# Patient Record
(demographics unavailable — no encounter records)

---

## 2024-12-10 NOTE — HISTORY OF PRESENT ILLNESS
[FreeTextEntry1] : patient came for follow up, taking medication for anxiety medication. Patient decrease appetite and not taking her metfromin that often. Review labs and noted hgba1c now 5.7, testosterone 42.

## 2024-12-10 NOTE — REVIEW OF SYSTEMS
[Recent Weight Loss (___ Lbs)] : recent weight loss: [unfilled] lbs [Depression] : depression [Anxiety] : anxiety [Stress] : stress [Negative] : Heme/Lymph [Suicidal Ideation] : no suicidal ideation [Insomnia] : no insomnia [Homicidal Ideation] : no homicidal ideation

## 2024-12-10 NOTE — PHYSICAL EXAM
[Alert] : alert [Well Nourished] : well nourished [Obese] : obese [No Acute Distress] : no acute distress [Well Developed] : well developed [Normal Sclera/Conjunctiva] : normal sclera/conjunctiva [EOMI] : extra ocular movement intact [PERRL] : pupils equal, round and reactive to light [No Proptosis] : no proptosis [Normal Outer Ear/Nose] : the ears and nose were normal in appearance [Normal Hearing] : hearing was normal [Supple] : the neck was supple [Thyroid Not Enlarged] : the thyroid was not enlarged [No Respiratory Distress] : no respiratory distress [No Accessory Muscle Use] : no accessory muscle use [Normal Rate and Effort] : normal respiratory rate and effort [Clear to Auscultation] : lungs were clear to auscultation bilaterally [Normal S1, S2] : normal S1 and S2 [Normal Rate] : heart rate was normal [Regular Rhythm] : with a regular rhythm [Carotids Normal] : carotid pulses were normal with no bruits [No Edema] : no peripheral edema [Pedal Pulses Normal] : the pedal pulses are present [Normal Bowel Sounds] : normal bowel sounds [Not Tender] : non-tender [Not Distended] : not distended [Soft] : abdomen soft [Normal Anterior Cervical Nodes] : no anterior cervical lymphadenopathy [No CVA Tenderness] : no ~M costovertebral angle tenderness [No Spinal Tenderness] : no spinal tenderness [Spine Straight] : spine straight [No Stigmata of Cushings Syndrome] : no stigmata of Cushings Syndrome [Normal Gait] : normal gait [Normal Strength/Tone] : muscle strength and tone were normal [No Rash] : no rash [No Motor Deficits] : the motor exam was normal [Normal Reflexes] : deep tendon reflexes were 2+ and symmetric [No Tremors] : no tremors [Oriented x3] : oriented to person, place, and time [Normal Affect] : the affect was normal [Normal Mood] : the mood was normal [Kyphosis] : no kyphosis present [Scoliosis] : no scoliosis [Acanthosis Nigricans] : no acanthosis nigricans [de-identified] : PCOS, metabolic syndrome, Obesity

## 2024-12-10 NOTE — PHYSICAL EXAM
[Alert] : alert [Well Nourished] : well nourished [Obese] : obese [No Acute Distress] : no acute distress [Well Developed] : well developed [Normal Sclera/Conjunctiva] : normal sclera/conjunctiva [EOMI] : extra ocular movement intact [PERRL] : pupils equal, round and reactive to light [No Proptosis] : no proptosis [Normal Outer Ear/Nose] : the ears and nose were normal in appearance [Normal Hearing] : hearing was normal [Supple] : the neck was supple [Thyroid Not Enlarged] : the thyroid was not enlarged [No Respiratory Distress] : no respiratory distress [No Accessory Muscle Use] : no accessory muscle use [Normal Rate and Effort] : normal respiratory rate and effort [Clear to Auscultation] : lungs were clear to auscultation bilaterally [Normal S1, S2] : normal S1 and S2 [Normal Rate] : heart rate was normal [Regular Rhythm] : with a regular rhythm [Carotids Normal] : carotid pulses were normal with no bruits [No Edema] : no peripheral edema [Pedal Pulses Normal] : the pedal pulses are present [Normal Bowel Sounds] : normal bowel sounds [Not Tender] : non-tender [Not Distended] : not distended [Soft] : abdomen soft [Normal Anterior Cervical Nodes] : no anterior cervical lymphadenopathy [No CVA Tenderness] : no ~M costovertebral angle tenderness [No Spinal Tenderness] : no spinal tenderness [Spine Straight] : spine straight [No Stigmata of Cushings Syndrome] : no stigmata of Cushings Syndrome [Normal Gait] : normal gait [Normal Strength/Tone] : muscle strength and tone were normal [No Rash] : no rash [No Motor Deficits] : the motor exam was normal [Normal Reflexes] : deep tendon reflexes were 2+ and symmetric [No Tremors] : no tremors [Oriented x3] : oriented to person, place, and time [Normal Affect] : the affect was normal [Normal Mood] : the mood was normal [Kyphosis] : no kyphosis present [Scoliosis] : no scoliosis [Acanthosis Nigricans] : no acanthosis nigricans [de-identified] : PCOS, metabolic syndrome, Obesity

## 2024-12-10 NOTE — ASSESSMENT
[Weight Loss] : weight loss [FreeTextEntry4] : 1600 vera ada diet [FreeTextEntry1] : 1. PCOS/ Obesity 1600 vera diet, exercise metformin 500 mg po -abdominal discomfort, will change to spironolactone 50 mg daily due elevated bp too. Discussed not to get pregnant. She said she is not planning.

## 2024-12-10 NOTE — PHYSICAL EXAM
[Alert] : alert [Well Nourished] : well nourished [Obese] : obese [No Acute Distress] : no acute distress [Well Developed] : well developed [Normal Sclera/Conjunctiva] : normal sclera/conjunctiva [EOMI] : extra ocular movement intact [PERRL] : pupils equal, round and reactive to light [No Proptosis] : no proptosis [Normal Outer Ear/Nose] : the ears and nose were normal in appearance [Normal Hearing] : hearing was normal [Supple] : the neck was supple [Thyroid Not Enlarged] : the thyroid was not enlarged [No Respiratory Distress] : no respiratory distress [No Accessory Muscle Use] : no accessory muscle use [Normal Rate and Effort] : normal respiratory rate and effort [Clear to Auscultation] : lungs were clear to auscultation bilaterally [Normal S1, S2] : normal S1 and S2 [Normal Rate] : heart rate was normal [Regular Rhythm] : with a regular rhythm [Carotids Normal] : carotid pulses were normal with no bruits [No Edema] : no peripheral edema [Pedal Pulses Normal] : the pedal pulses are present [Normal Bowel Sounds] : normal bowel sounds [Not Tender] : non-tender [Not Distended] : not distended [Soft] : abdomen soft [Normal Anterior Cervical Nodes] : no anterior cervical lymphadenopathy [No CVA Tenderness] : no ~M costovertebral angle tenderness [No Spinal Tenderness] : no spinal tenderness [Spine Straight] : spine straight [No Stigmata of Cushings Syndrome] : no stigmata of Cushings Syndrome [Normal Gait] : normal gait [Normal Strength/Tone] : muscle strength and tone were normal [No Rash] : no rash [No Motor Deficits] : the motor exam was normal [Normal Reflexes] : deep tendon reflexes were 2+ and symmetric [No Tremors] : no tremors [Oriented x3] : oriented to person, place, and time [Normal Affect] : the affect was normal [Normal Mood] : the mood was normal [Kyphosis] : no kyphosis present [Scoliosis] : no scoliosis [Acanthosis Nigricans] : no acanthosis nigricans [de-identified] : PCOS, metabolic syndrome, Obesity

## 2025-03-11 NOTE — ASSESSMENT
[Weight Loss] : weight loss [FreeTextEntry4] : 1600 vera diet, exericise [FreeTextEntry1] : 1. PCOS/ Obesity 1600 vera diet, exercise metformin 500 mg po -abdominal discomfort occasionally will decrease to once a day  will change to spironolactone 50 mg daily due elevated bp too- discussed about pregnancy adverse effect Discussed not to get pregnant. She said she is not planning.

## 2025-03-11 NOTE — PHYSICAL EXAM
[Alert] : alert [Well Nourished] : well nourished [Obese] : obese [No Acute Distress] : no acute distress [Well Developed] : well developed [Normal Sclera/Conjunctiva] : normal sclera/conjunctiva [EOMI] : extra ocular movement intact [PERRL] : pupils equal, round and reactive to light [No Proptosis] : no proptosis [Normal Outer Ear/Nose] : the ears and nose were normal in appearance [Normal Hearing] : hearing was normal [Supple] : the neck was supple [Thyroid Not Enlarged] : the thyroid was not enlarged [No Respiratory Distress] : no respiratory distress [No Accessory Muscle Use] : no accessory muscle use [Normal Rate and Effort] : normal respiratory rate and effort [Clear to Auscultation] : lungs were clear to auscultation bilaterally [Normal S1, S2] : normal S1 and S2 [Normal Rate] : heart rate was normal [Regular Rhythm] : with a regular rhythm [Carotids Normal] : carotid pulses were normal with no bruits [No Edema] : no peripheral edema [Pedal Pulses Normal] : the pedal pulses are present [Normal Bowel Sounds] : normal bowel sounds [Not Tender] : non-tender [Not Distended] : not distended [Soft] : abdomen soft [Normal Anterior Cervical Nodes] : no anterior cervical lymphadenopathy [No CVA Tenderness] : no ~M costovertebral angle tenderness [No Spinal Tenderness] : no spinal tenderness [Spine Straight] : spine straight [Kyphosis] : no kyphosis present [Scoliosis] : no scoliosis [No Stigmata of Cushings Syndrome] : no stigmata of Cushings Syndrome [Normal Gait] : normal gait [Normal Strength/Tone] : muscle strength and tone were normal [No Rash] : no rash [Acanthosis Nigricans] : no acanthosis nigricans [No Motor Deficits] : the motor exam was normal [Normal Reflexes] : deep tendon reflexes were 2+ and symmetric [No Tremors] : no tremors [Oriented x3] : oriented to person, place, and time [Normal Affect] : the affect was normal [Normal Mood] : the mood was normal [de-identified] : PCOS, metabolic syndrome, Obesity

## 2025-03-11 NOTE — HISTORY OF PRESENT ILLNESS
[FreeTextEntry1] : patient came for follow up weight management, taking medication for anxiety medication.  Patient decrease appetite and not taking her metformin that often.  Review labs and noted hgba1c now 5.3, was 5.7, testosterone 30,was 42. Current bmi is 34, was 36 she wants to lose weight.  Would like to get injections Wegovy, will try to get approval.

## 2025-04-14 NOTE — ASSESSMENT
[Weight Loss] : weight loss [FreeTextEntry4] : 1600 vera ada diet, exercise [FreeTextEntry1] : 1. PCOS/ Obesity 1600 vera diet, exercise metformin 500 mg po -abdominal discomfort occasionally will decrease to once a day  will change to spironolactone 50 mg daily due elevated bp too- discussed about pregnancy adverse effect Discussed not to get pregnant. She said she is not planning. will start Wegovy compounding 0.25 mg with vitamin b 12 sc weekly.

## 2025-04-14 NOTE — PHYSICAL EXAM
[Alert] : alert [Well Nourished] : well nourished [Obese] : obese [No Acute Distress] : no acute distress [Well Developed] : well developed [Normal Sclera/Conjunctiva] : normal sclera/conjunctiva [EOMI] : extra ocular movement intact [PERRL] : pupils equal, round and reactive to light [No Proptosis] : no proptosis [Normal Outer Ear/Nose] : the ears and nose were normal in appearance [Normal Hearing] : hearing was normal [Supple] : the neck was supple [Thyroid Not Enlarged] : the thyroid was not enlarged [No Respiratory Distress] : no respiratory distress [No Accessory Muscle Use] : no accessory muscle use [Normal Rate and Effort] : normal respiratory rate and effort [Clear to Auscultation] : lungs were clear to auscultation bilaterally [Normal S1, S2] : normal S1 and S2 [Normal Rate] : heart rate was normal [Regular Rhythm] : with a regular rhythm [Carotids Normal] : carotid pulses were normal with no bruits [No Edema] : no peripheral edema [Pedal Pulses Normal] : the pedal pulses are present [Normal Bowel Sounds] : normal bowel sounds [Not Tender] : non-tender [Not Distended] : not distended [Soft] : abdomen soft [Normal Anterior Cervical Nodes] : no anterior cervical lymphadenopathy [No CVA Tenderness] : no ~M costovertebral angle tenderness [No Spinal Tenderness] : no spinal tenderness [Spine Straight] : spine straight [Kyphosis] : no kyphosis present [Scoliosis] : no scoliosis [No Stigmata of Cushings Syndrome] : no stigmata of Cushings Syndrome [Normal Gait] : normal gait [Normal Strength/Tone] : muscle strength and tone were normal [No Rash] : no rash [Acanthosis Nigricans] : no acanthosis nigricans [No Motor Deficits] : the motor exam was normal [Normal Reflexes] : deep tendon reflexes were 2+ and symmetric [No Tremors] : no tremors [Oriented x3] : oriented to person, place, and time [Normal Affect] : the affect was normal [Normal Mood] : the mood was normal [de-identified] : BMI 34.6, was 36 [de-identified] : PCOS, metabolic syndrome, Obesity

## 2025-04-14 NOTE — HISTORY OF PRESENT ILLNESS
[FreeTextEntry1] : patient came for follow up weight management, taking medication for anxiety medication.  Patient history of seasonal allergies, but last weeks had to go to ER for Asthma attack. Needs referral to pulmonology. Currently taking Prednisone & albuterol nebulizer. She had covid 1 month ago.  Patient decrease appetite and not taking her metformin that often.  Has appointment with GYN this week.  Patient ability was discontinued by psychiatry and started Trileptal 150 mg daily. Feels great.  Review labs and noted hgba1c now 5.4. Would like to get injections Wegovy, will try to get compounding and will start.

## 2025-06-19 NOTE — PHYSICAL EXAM
[Alert] : alert [Well Nourished] : well nourished [Obese] : obese [No Acute Distress] : no acute distress [Well Developed] : well developed [Normal Sclera/Conjunctiva] : normal sclera/conjunctiva [EOMI] : extra ocular movement intact [PERRL] : pupils equal, round and reactive to light [No Proptosis] : no proptosis [Normal Outer Ear/Nose] : the ears and nose were normal in appearance [Normal Hearing] : hearing was normal [Supple] : the neck was supple [Thyroid Not Enlarged] : the thyroid was not enlarged [No Respiratory Distress] : no respiratory distress [No Accessory Muscle Use] : no accessory muscle use [Normal Rate and Effort] : normal respiratory rate and effort [Clear to Auscultation] : lungs were clear to auscultation bilaterally [Normal S1, S2] : normal S1 and S2 [Normal Rate] : heart rate was normal [Regular Rhythm] : with a regular rhythm [Carotids Normal] : carotid pulses were normal with no bruits [No Edema] : no peripheral edema [Pedal Pulses Normal] : the pedal pulses are present [Normal Bowel Sounds] : normal bowel sounds [Not Tender] : non-tender [Not Distended] : not distended [Soft] : abdomen soft [Normal Anterior Cervical Nodes] : no anterior cervical lymphadenopathy [No CVA Tenderness] : no ~M costovertebral angle tenderness [No Spinal Tenderness] : no spinal tenderness [Spine Straight] : spine straight [Kyphosis] : no kyphosis present [Scoliosis] : no scoliosis [No Stigmata of Cushings Syndrome] : no stigmata of Cushings Syndrome [Normal Gait] : normal gait [Normal Strength/Tone] : muscle strength and tone were normal [No Rash] : no rash [Acanthosis Nigricans] : no acanthosis nigricans [Cranial Nerves Intact] : cranial nerves 2-12 were intact [No Motor Deficits] : the motor exam was normal [Normal Reflexes] : deep tendon reflexes were 2+ and symmetric [No Tremors] : no tremors [Oriented x3] : oriented to person, place, and time [Normal Affect] : the affect was normal [Normal Mood] : the mood was normal [de-identified] : BMI 33.8, was 36 [de-identified] : PCOS, metabolic syndrome, Obesity

## 2025-06-19 NOTE — ASSESSMENT
[Weight Loss] : weight loss [Other____] : [unfilled] [FreeTextEntry4] : [address] vera , exercise [FreeTextEntry1] : 1. PCOS/ Obesity 1600 vera diet, exercise metformin 500 mg po -abdominal discomfort occasionally will decrease to once a day  will change to spironolactone 50 mg daily due elevated bp too- discussed about pregnancy adverse effect Discussed not to get pregnant. She said she is not planning. Wegovy compounding 0.25 mg with vitamin b 12 sc weekly. will increase to 0.50 mg weekly She denies adverse effects.

## 2025-06-19 NOTE — HISTORY OF PRESENT ILLNESS
[FreeTextEntry1] : patient came for follow up weight management, taking medication for anxiety medication.  6/19/25 Started on Semaglutide 0.25 mg sc weekly compounding with vitamin B12 x 4 weeks. Denies any adverse effect. Lost 18 pounds. Seen by gyn told had cyst in right ovary. Taking Spirolactone 50 mg daily and feels good and wants to stay. Will go up Semaglutide to 0.5 mg daily  4/14/25 Patient history of seasonal allergies, but last weeks had to go to ER for Asthma attack. Needs referral to pulmonology. Currently taking Prednisone & albuterol nebulizer. She had covid 1 month ago. Patient decrease appetite and not taking her metformin that often.  Has appointment with GYN this week. Patient ability was discontinued by psychiatry and started Trileptal 150 mg daily. Feels great. Review labs and noted hgba1c now 5.4. Would like to get injections Wegovy, will try to get compounding and will start.

## 2025-06-19 NOTE — DATA REVIEWED
[No studies available for review at this time.] : No studies available for review at this time. [FreeTextEntry1] : 4/29/25 glucose 131, gfr 95, mg 1.9  8/21/24 glucose 108, gfr 82, chol 189, hdl 33, trig 141, ldl 128, non 156, hgba1c 5.4, b12 673, folate 9.8, vit d 31, testosterone 35.4, tsh 1.57, ft4 1.0, free testosterone 2.7

## 2025-06-27 NOTE — HISTORY OF PRESENT ILLNESS
[TextBox_4] : - Summary : The patient is here for follow-up of respiratory issues that started after a COVID-19 infection and were exacerbated during allergy season. The patient also reports sleep-related concerns. - Chief Complaint (CC) : Follow-up for breathing difficulties and fatigue - History of Present Illness : The patient, a middle-aged individual, presents for follow-up of respiratory issues that began after a COVID-19 infection in February/March of the previous year. The patient reports experiencing significant breathing difficulties during the subsequent allergy season, requiring urgent care visits and treatment with albuterol, prednisone, and eventually a Wixela inhaler. The patient had no prior history of asthma. The respiratory symptoms have improved since starting the Wixela inhaler on April 30th, with five days of treatment remaining. The patient also reports sleep-related concerns, including feeling tired despite getting approximately 7 hours of sleep per night. - Past Medical History : COVID-19 infection in February/March of the previous year, followed by bronchitis. No prior history of asthma or other significant respiratory conditions.

## 2025-06-27 NOTE — REASON FOR VISIT
[Initial] : an initial visit [Sleep Evaluation] : sleep evaluation [Shortness of Breath] : shortness of breath [Wheezing] : wheezing

## 2025-06-27 NOTE — ASSESSMENT
[FreeTextEntry1] : Assessment and Plan: - Asthma-like symptoms post-COVID-19 : The patient has developed asthma-like symptoms following a COVID-19 infection, exacerbated during allergy season. This is consistent with post-COVID respiratory sequelae and possibly newly developed asthma. - Continue Wixela inhaler as prescribed  - Monitor symptoms and adjust treatment as needed  - Use albuterol inhaler as needed for acute symptoms  - Consider restarting Wixela before next allergy season or at first sign of symptoms  - Educate patient on the potential long-term effects of COVID-19 on respiratory health  - Follow-up to assess the effectiveness of the current treatment plan  - Possible Obstructive Sleep Apnea : Patient reports feeling tired despite adequate sleep duration, and mentions some snoring. These symptoms, along with nasal congestion, suggest possible obstructive sleep apnea. - Arrange for a home sleep study to evaluate for sleep apnea  - Educate patient on sleep hygiene and the importance of diagnosing and treating sleep apnea  - Follow-up to discuss sleep study results and potential treatment options if sleep apnea is confirmed  - Chronic Nasal Congestion : Patient reports difficulty breathing through the nose and has a nasal quality to their voice, suggesting chronic nasal congestion. - Prescribe Azelastine nasal spray, one spray in each nostril twice daily  - Recommend over-the-counter saline nasal spray (e.g., Simply Saline) to be used 2-3 times daily  - Educate patient on proper nasal hygiene techniques  - Follow-up to assess the effectiveness of the nasal treatment regimen